# Patient Record
Sex: FEMALE | Race: WHITE | Employment: OTHER | URBAN - METROPOLITAN AREA
[De-identification: names, ages, dates, MRNs, and addresses within clinical notes are randomized per-mention and may not be internally consistent; named-entity substitution may affect disease eponyms.]

---

## 2024-03-01 ENCOUNTER — HOSPITAL ENCOUNTER (OUTPATIENT)
Age: 74
Discharge: ANOTHER HOSPITAL | End: 2024-03-01
Payer: MEDICARE

## 2024-03-01 VITALS
HEART RATE: 84 BPM | RESPIRATION RATE: 18 BRPM | TEMPERATURE: 99 F | SYSTOLIC BLOOD PRESSURE: 152 MMHG | DIASTOLIC BLOOD PRESSURE: 75 MMHG | OXYGEN SATURATION: 99 %

## 2024-03-01 DIAGNOSIS — M54.41 ACUTE RIGHT-SIDED LOW BACK PAIN WITH RIGHT-SIDED SCIATICA: ICD-10-CM

## 2024-03-01 DIAGNOSIS — R10.823 RIGHT LOWER QUADRANT ABDOMINAL TENDERNESS WITH REBOUND TENDERNESS: Primary | ICD-10-CM

## 2024-03-01 PROCEDURE — 99204 OFFICE O/P NEW MOD 45 MIN: CPT | Performed by: NURSE PRACTITIONER

## 2024-03-01 NOTE — ED PROVIDER NOTES
Patient Seen in: [facility]      History     Chief Complaint   Patient presents with    Back Pain     Stated Complaint: right side back and leg pain    Subjective:   74 y/o female with hypothyroidism, anemia, PAD, neuropathy presents with complaints of right low back pain x 1 month.  Pain has gradually worsened for the last 2 weeks with intermittent pain radiating down right leg to her toes.  States yesterday pain worsened with pain wrapping around to her right groin and right lower abdomen.  States saw her PCP and was diagnosed with sciatica.  Was told to take Tylenol and given Rx for tramadol.  Patient states also took a couple of her daughters muscle relaxers.  No resolution in pain.  No fever/chills, injury/trauma, urinary symptoms, nausea/vomiting, extremity weakness, extremity swelling            Objective:   History reviewed. No pertinent past medical history.           History reviewed. No pertinent surgical history.             Social History     Socioeconomic History    Marital status:    Tobacco Use    Smoking status: Never    Smokeless tobacco: Never              Review of Systems   Constitutional:  Negative for chills and fever.   Respiratory:  Negative for cough and shortness of breath.    Cardiovascular:  Negative for chest pain and leg swelling.   Gastrointestinal:  Positive for abdominal pain. Negative for diarrhea, nausea and vomiting.   Genitourinary:  Negative for dysuria.   Neurological:  Negative for headaches.   All other systems reviewed and are negative.      Positive for stated complaint: right side back and leg pain  Other systems are as noted in HPI.  Constitutional and vital signs reviewed.      All other systems reviewed and negative except as noted above.    Physical Exam     ED Triage Vitals [03/01/24 0936]   BP (!) 183/77   Pulse 84   Resp 18   Temp 98.9 °F (37.2 °C)   Temp src Temporal   SpO2 99 %   O2 Device None (Room air)       Current:/75   Pulse 84    Temp 98.9 °F (37.2 °C) (Temporal)   Resp 18   SpO2 99%         Physical Exam  Vitals and nursing note reviewed.   Constitutional:       General: She is not in acute distress.     Appearance: Normal appearance. She is not ill-appearing.   HENT:      Head: Normocephalic.      Right Ear: Tympanic membrane and external ear normal.      Left Ear: Tympanic membrane and external ear normal.      Mouth/Throat:      Mouth: Mucous membranes are moist.      Pharynx: Oropharynx is clear. Uvula midline.      Tonsils: No tonsillar exudate.   Cardiovascular:      Rate and Rhythm: Normal rate and regular rhythm.   Pulmonary:      Effort: Pulmonary effort is normal.      Breath sounds: Normal breath sounds.   Abdominal:      General: Bowel sounds are normal.      Palpations: Abdomen is soft.      Tenderness: There is abdominal tenderness in the right lower quadrant. Positive signs include McBurney's sign.   Musculoskeletal:         General: Normal range of motion.      Cervical back: Normal range of motion.      Lumbar back: Tenderness present. No bony tenderness.      Comments: Tender to palpate to R lumbar paraspinal, right buttock area - no spine pain. SLR negative. 2+ DTR. 5+stength BUE, BLE, 2+pedal pulses.      Skin:     General: Skin is warm.      Capillary Refill: Capillary refill takes less than 2 seconds.   Neurological:      Mental Status: She is alert and oriented to person, place, and time.   Psychiatric:         Behavior: Behavior is cooperative.               ED Course   Labs Reviewed - No data to display                   MDM            Medical Decision Making  Patient is well-appearing.  Has tenderness on palpation of right lower abdomen, right buttock  I discussed differentials with patient including but not limited to right low back pain with radiculopathy versus kidney stone versus appendicitis  Discussed with patient concern for appendicitis due to tenderness and guarding with to the right lower  quadrant  Discussed with patient needs further evaluation in the emergency room   Patient will transport self to [facility] Emergency department  Discussed with Dr. Parada            Problems Addressed:  Acute right-sided low back pain with right-sided sciatica: acute illness or injury  Right lower quadrant abdominal tenderness with rebound tenderness: acute illness or injury        Disposition and Plan     Clinical Impression:  1. Right lower quadrant abdominal tenderness with rebound tenderness    2. Acute right-sided low back pain with right-sided sciatica         Disposition:  Ic to ed  3/1/2024 10:11 am    Follow-up:  No follow-up provider specified.        Medications Prescribed:  There are no discharge medications for this patient.

## 2024-03-01 NOTE — ED INITIAL ASSESSMENT (HPI)
Pt with right lower back pain that shoots down right leg to toes for past month that has worsened over past 2 wks; denies injury